# Patient Record
Sex: FEMALE | Race: WHITE | NOT HISPANIC OR LATINO | Employment: OTHER | URBAN - NONMETROPOLITAN AREA
[De-identification: names, ages, dates, MRNs, and addresses within clinical notes are randomized per-mention and may not be internally consistent; named-entity substitution may affect disease eponyms.]

---

## 2018-05-09 ENCOUNTER — OFFICE VISIT (OUTPATIENT)
Dept: SURGERY | Facility: CLINIC | Age: 58
End: 2018-05-09

## 2018-05-09 VITALS — HEIGHT: 63 IN | WEIGHT: 166 LBS | BODY MASS INDEX: 29.41 KG/M2

## 2018-05-09 DIAGNOSIS — Z95.828 PORTACATH IN PLACE: Primary | ICD-10-CM

## 2018-05-09 PROCEDURE — 36590 REMOVAL TUNNELED CV CATH: CPT | Performed by: SURGERY

## 2018-05-11 PROBLEM — Z95.828 PORTACATH IN PLACE: Status: ACTIVE | Noted: 2018-05-11

## 2018-05-11 NOTE — PROGRESS NOTES
Port removal    The patient left chest was prepped and draped in usual sterile fashion.  Timeout procedure was performed.  Local anesthetic was inflated overlying the port.  Through the previous placement incision and incision was made with a 15 blade scalpel.  Dissection was carried to the subcutaneous tissues tissues to the scar tissue surrounding the port.  This was opened sharply with a scalpel and the catheter tubing was removed from the left subclavian vein.  Direct pressure was held to hemostatic.  The subcutaneous port was then freed from surrounding scar tissue and removed.  The wound was hemostatic and closed in layers and dressed with sure close.  Patient tolerated the procedure well.    Estimated blood loss 5 mL    Specimens: Port    Complications none

## 2020-12-02 ENCOUNTER — HOSPITAL ENCOUNTER (EMERGENCY)
Facility: HOSPITAL | Age: 60
Discharge: HOME | End: 2020-12-03
Attending: FAMILY MEDICINE | Admitting: FAMILY MEDICINE

## 2020-12-02 DIAGNOSIS — N39.0 URINARY TRACT INFECTION WITHOUT HEMATURIA, SITE UNSPECIFIED: Primary | ICD-10-CM

## 2020-12-02 DIAGNOSIS — N28.89 RENAL MASS: ICD-10-CM

## 2020-12-02 PROCEDURE — 99283 EMERGENCY DEPT VISIT LOW MDM: CPT

## 2020-12-03 ENCOUNTER — APPOINTMENT (OUTPATIENT)
Dept: CT IMAGING | Facility: HOSPITAL | Age: 60
End: 2020-12-03

## 2020-12-03 VITALS
DIASTOLIC BLOOD PRESSURE: 95 MMHG | WEIGHT: 179 LBS | BODY MASS INDEX: 31.71 KG/M2 | SYSTOLIC BLOOD PRESSURE: 141 MMHG | TEMPERATURE: 98.7 F | RESPIRATION RATE: 20 BRPM | HEIGHT: 63 IN | HEART RATE: 87 BPM | OXYGEN SATURATION: 95 %

## 2020-12-03 LAB
ALBUMIN SERPL-MCNC: 3.51 G/DL (ref 3.5–5.2)
ALBUMIN/GLOB SERPL: 1.2 G/DL
ALP SERPL-CCNC: 98 U/L (ref 39–117)
ALT SERPL W P-5'-P-CCNC: 9 U/L (ref 1–33)
ANION GAP SERPL CALCULATED.3IONS-SCNC: 10.5 MMOL/L (ref 5–15)
AST SERPL-CCNC: 16 U/L (ref 1–32)
BACTERIA UR QL AUTO: ABNORMAL /HPF
BASOPHILS # BLD AUTO: 0.06 10*3/MM3 (ref 0–0.2)
BASOPHILS NFR BLD AUTO: 0.5 % (ref 0–1.5)
BILIRUB SERPL-MCNC: 0.3 MG/DL (ref 0–1.2)
BILIRUB UR QL STRIP: NEGATIVE
BUN SERPL-MCNC: 7 MG/DL (ref 8–23)
BUN/CREAT SERPL: 7.9 (ref 7–25)
CALCIUM SPEC-SCNC: 9 MG/DL (ref 8.6–10.5)
CHLORIDE SERPL-SCNC: 104 MMOL/L (ref 98–107)
CLARITY UR: ABNORMAL
CO2 SERPL-SCNC: 22.5 MMOL/L (ref 22–29)
COLOR UR: YELLOW
CREAT SERPL-MCNC: 0.89 MG/DL (ref 0.57–1)
CRP SERPL-MCNC: 0.57 MG/DL (ref 0–0.5)
D-LACTATE SERPL-SCNC: 1.3 MMOL/L (ref 0.5–2)
DEPRECATED RDW RBC AUTO: 46.8 FL (ref 37–54)
EOSINOPHIL # BLD AUTO: 0.14 10*3/MM3 (ref 0–0.4)
EOSINOPHIL NFR BLD AUTO: 1.1 % (ref 0.3–6.2)
ERYTHROCYTE [DISTWIDTH] IN BLOOD BY AUTOMATED COUNT: 14.4 % (ref 12.3–15.4)
GFR SERPL CREATININE-BSD FRML MDRD: 65 ML/MIN/1.73
GLOBULIN UR ELPH-MCNC: 2.9 GM/DL
GLUCOSE SERPL-MCNC: 124 MG/DL (ref 65–99)
GLUCOSE UR STRIP-MCNC: NEGATIVE MG/DL
HCT VFR BLD AUTO: 40.8 % (ref 34–46.6)
HGB BLD-MCNC: 13.2 G/DL (ref 12–15.9)
HGB UR QL STRIP.AUTO: ABNORMAL
HYALINE CASTS UR QL AUTO: ABNORMAL /LPF
IMM GRANULOCYTES # BLD AUTO: 0.06 10*3/MM3 (ref 0–0.05)
IMM GRANULOCYTES NFR BLD AUTO: 0.5 % (ref 0–0.5)
KETONES UR QL STRIP: NEGATIVE
LEUKOCYTE ESTERASE UR QL STRIP.AUTO: ABNORMAL
LYMPHOCYTES # BLD AUTO: 2.24 10*3/MM3 (ref 0.7–3.1)
LYMPHOCYTES NFR BLD AUTO: 17.3 % (ref 19.6–45.3)
MCH RBC QN AUTO: 29 PG (ref 26.6–33)
MCHC RBC AUTO-ENTMCNC: 32.4 G/DL (ref 31.5–35.7)
MCV RBC AUTO: 89.7 FL (ref 79–97)
MONOCYTES # BLD AUTO: 0.89 10*3/MM3 (ref 0.1–0.9)
MONOCYTES NFR BLD AUTO: 6.9 % (ref 5–12)
NEUTROPHILS NFR BLD AUTO: 73.7 % (ref 42.7–76)
NEUTROPHILS NFR BLD AUTO: 9.54 10*3/MM3 (ref 1.7–7)
NITRITE UR QL STRIP: NEGATIVE
NRBC BLD AUTO-RTO: 0 /100 WBC (ref 0–0.2)
PH UR STRIP.AUTO: 6.5 [PH] (ref 5–8)
PLATELET # BLD AUTO: 271 10*3/MM3 (ref 140–450)
PMV BLD AUTO: 8.9 FL (ref 6–12)
POTASSIUM SERPL-SCNC: 3.9 MMOL/L (ref 3.5–5.2)
PROT SERPL-MCNC: 6.4 G/DL (ref 6–8.5)
PROT UR QL STRIP: NEGATIVE
RBC # BLD AUTO: 4.55 10*6/MM3 (ref 3.77–5.28)
RBC # UR: ABNORMAL /HPF
REF LAB TEST METHOD: ABNORMAL
SODIUM SERPL-SCNC: 137 MMOL/L (ref 136–145)
SP GR UR STRIP: 1.01 (ref 1–1.03)
SQUAMOUS #/AREA URNS HPF: ABNORMAL /HPF
UROBILINOGEN UR QL STRIP: ABNORMAL
WBC # BLD AUTO: 12.93 10*3/MM3 (ref 3.4–10.8)
WBC UR QL AUTO: ABNORMAL /HPF

## 2020-12-03 PROCEDURE — 87040 BLOOD CULTURE FOR BACTERIA: CPT | Performed by: FAMILY MEDICINE

## 2020-12-03 PROCEDURE — 96375 TX/PRO/DX INJ NEW DRUG ADDON: CPT

## 2020-12-03 PROCEDURE — 86140 C-REACTIVE PROTEIN: CPT | Performed by: FAMILY MEDICINE

## 2020-12-03 PROCEDURE — 36415 COLL VENOUS BLD VENIPUNCTURE: CPT

## 2020-12-03 PROCEDURE — 87186 SC STD MICRODIL/AGAR DIL: CPT | Performed by: FAMILY MEDICINE

## 2020-12-03 PROCEDURE — 96374 THER/PROPH/DIAG INJ IV PUSH: CPT

## 2020-12-03 PROCEDURE — 87086 URINE CULTURE/COLONY COUNT: CPT | Performed by: FAMILY MEDICINE

## 2020-12-03 PROCEDURE — 80053 COMPREHEN METABOLIC PANEL: CPT | Performed by: FAMILY MEDICINE

## 2020-12-03 PROCEDURE — 87077 CULTURE AEROBIC IDENTIFY: CPT | Performed by: FAMILY MEDICINE

## 2020-12-03 PROCEDURE — 25010000002 IOPAMIDOL 61 % SOLUTION: Performed by: FAMILY MEDICINE

## 2020-12-03 PROCEDURE — 25010000002 KETOROLAC TROMETHAMINE PER 15 MG: Performed by: FAMILY MEDICINE

## 2020-12-03 PROCEDURE — 83605 ASSAY OF LACTIC ACID: CPT | Performed by: FAMILY MEDICINE

## 2020-12-03 PROCEDURE — 85025 COMPLETE CBC W/AUTO DIFF WBC: CPT | Performed by: FAMILY MEDICINE

## 2020-12-03 PROCEDURE — 74177 CT ABD & PELVIS W/CONTRAST: CPT

## 2020-12-03 PROCEDURE — 81001 URINALYSIS AUTO W/SCOPE: CPT | Performed by: FAMILY MEDICINE

## 2020-12-03 RX ORDER — HYDROCODONE BITARTRATE AND ACETAMINOPHEN 5; 325 MG/1; MG/1
1 TABLET ORAL EVERY 6 HOURS PRN
Status: DISCONTINUED | OUTPATIENT
Start: 2020-12-03 | End: 2020-12-03 | Stop reason: HOSPADM

## 2020-12-03 RX ORDER — KETOROLAC TROMETHAMINE 30 MG/ML
15 INJECTION, SOLUTION INTRAMUSCULAR; INTRAVENOUS ONCE
Status: COMPLETED | OUTPATIENT
Start: 2020-12-03 | End: 2020-12-03

## 2020-12-03 RX ORDER — ORPHENADRINE CITRATE 30 MG/ML
60 INJECTION INTRAMUSCULAR; INTRAVENOUS ONCE
Status: DISCONTINUED | OUTPATIENT
Start: 2020-12-03 | End: 2020-12-03

## 2020-12-03 RX ORDER — SODIUM CHLORIDE 0.9 % (FLUSH) 0.9 %
10 SYRINGE (ML) INJECTION AS NEEDED
Status: DISCONTINUED | OUTPATIENT
Start: 2020-12-03 | End: 2020-12-03 | Stop reason: HOSPADM

## 2020-12-03 RX ADMIN — LIDOCAINE HYDROCHLORIDE 125 MG: 10 INJECTION, SOLUTION INFILTRATION; PERINEURAL at 03:45

## 2020-12-03 RX ADMIN — SODIUM CHLORIDE 1000 ML: 9 INJECTION, SOLUTION INTRAVENOUS at 00:36

## 2020-12-03 RX ADMIN — IOPAMIDOL 100 ML: 612 INJECTION, SOLUTION INTRAVENOUS at 02:03

## 2020-12-03 RX ADMIN — HYDROCODONE BITARTRATE AND ACETAMINOPHEN 1 TABLET: 5; 325 TABLET ORAL at 04:14

## 2020-12-03 RX ADMIN — KETOROLAC TROMETHAMINE 15 MG: 30 INJECTION, SOLUTION INTRAMUSCULAR; INTRAVENOUS at 00:36

## 2020-12-03 NOTE — DISCHARGE INSTRUCTIONS
COntinue Cipro  Increase oral hydration  NOtify Dr Fernando at [facility] for follow up appointment

## 2020-12-03 NOTE — ED PROVIDER NOTES
Subjective   Patient reports that she has been treated for a UTI for the last 3 days she has been on Cipro and then today developed abdominal discomfort as well as left flank pain and so she is afraid that her UTI is worsening.      History provided by:  Patient  Flank Pain  Pain location:  L flank  Pain quality: aching    Pain radiates to:  Does not radiate  Pain severity:  Moderate  Timing:  Intermittent  Progression:  Worsening  Chronicity:  New  Context: recent illness    Relieved by:  Nothing  Worsened by:  Nothing  Ineffective treatments:  None tried  Associated symptoms: nausea    Associated symptoms: no chest pain, no dysuria and no fever    Risk factors: being elderly and obesity        Review of Systems   Constitutional: Negative.  Negative for fever.   HENT: Negative.    Respiratory: Negative.    Cardiovascular: Negative.  Negative for chest pain.   Gastrointestinal: Positive for nausea. Negative for abdominal pain.   Endocrine: Negative.    Genitourinary: Positive for flank pain. Negative for dysuria.   Skin: Negative.    Neurological: Negative.    Psychiatric/Behavioral: Negative.    All other systems reviewed and are negative.      Past Medical History:   Diagnosis Date   • Anxiety    • Arthritis    • Asthma    • Cancer (CMS/HCC)     esogeal   • COPD (chronic obstructive pulmonary disease) (CMS/HCC)    • Depression    • Fluid retention    • GERD (gastroesophageal reflux disease)    • Hyperlipidemia    • Mobitz (type) II atrioventricular block    • PTSD (post-traumatic stress disorder)        Allergies   Allergen Reactions   • Codeine GI Intolerance   • Penicillins Hives       Past Surgical History:   Procedure Laterality Date   • CATARACT EXTRACTION Bilateral    •  SECTION     • CHOLECYSTECTOMY     • ENDOSCOPY W/ PEG TUBE PLACEMENT N/A 2016    Procedure: ESOPHAGOGASTRODUODENOSCOPY REMOVE AND REPLACE PEG TUBE;  Surgeon: Leonardo Alonso MD;  Location: [facility];  Service:    •  ESOPHAGECTOMY     • PEG TUBE REPLACEMENT  06/06/2016   • SALPINGO OOPHORECTOMY Left 1991       Family History   Problem Relation Age of Onset   • Cancer Father    • Heart disease Maternal Aunt    • Cancer Maternal Aunt    • Cancer Maternal Grandmother    • Cancer Maternal Grandfather    • Cancer Paternal Grandfather        Social History     Socioeconomic History   • Marital status:      Spouse name: Not on file   • Number of children: Not on file   • Years of education: Not on file   • Highest education level: Not on file   Tobacco Use   • Smoking status: Former Smoker     Packs/day: 1.00     Years: 40.00     Pack years: 40.00   Substance and Sexual Activity   • Alcohol use: No   • Drug use: No   • Sexual activity: Never           Objective   Physical Exam  Vitals signs and nursing note reviewed.   Constitutional:       Appearance: Normal appearance.   HENT:      Head: Normocephalic and atraumatic.      Left Ear: Tympanic membrane normal.      Nose: Nose normal.      Mouth/Throat:      Mouth: Mucous membranes are dry.   Neck:      Musculoskeletal: Normal range of motion.   Cardiovascular:      Rate and Rhythm: Normal rate and regular rhythm.      Pulses: Normal pulses.   Pulmonary:      Effort: Pulmonary effort is normal.      Breath sounds: Normal breath sounds.   Abdominal:      General: Abdomen is flat.   Musculoskeletal: Normal range of motion.   Skin:     General: Skin is warm.      Capillary Refill: Capillary refill takes less than 2 seconds.   Neurological:      General: No focal deficit present.      Mental Status: She is alert and oriented to person, place, and time.         Procedures           ED Course  ED Course as of Dec 04 1345   Thu Dec 03, 2020   0250 No clearly acute findings in the abdomen or pelvis.  2. Enhancing mass in the central right kidney highly suspicious for renal cell carcinoma. No evidence of metastatic disease. Urologic follow-up recommended.  3. Nonobstructing bilateral  kidney stones. No ureteral stones. No hydronephrosis. There is no CT evidence of acute pyelonephritis.  4. Cholecystectomy with intra and extrahepatic biliary ductal dilatation, stable from prior.  5. Moderate stool burden in the colon suggesting constipation. There is colonic diverticulosis without diverticulitis.  6. Normal appendix. No small bowel obstruction.  7. Additional findings as above.       Findings with patient patient was already aware of the right sided renal mass.  She follows with Dr. Fernando at the [facility] at .  He is due for repeat scan in 2 weeks.    [MH]   0300 Patient is already on ciprofloxacin for UTI she has had 3 doses and advised her to continue that until the treatment is resolved.  Patient shows no bacteria seen on this UA however her culture has went down.    [MH]      ED Course User Index  [] Riddhi Yeh DO                                           MDM  Number of Diagnoses or Management Options  Renal mass: new and requires workup  Urinary tract infection without hematuria, site unspecified: new and requires workup     Amount and/or Complexity of Data Reviewed  Clinical lab tests: ordered and reviewed  Tests in the radiology section of CPT®: ordered and reviewed  Tests in the medicine section of CPT®: reviewed and ordered  Independent visualization of images, tracings, or specimens: yes    Risk of Complications, Morbidity, and/or Mortality  Presenting problems: high  Diagnostic procedures: high  Management options: moderate        Final diagnoses:   Urinary tract infection without hematuria, site unspecified   Renal mass            Riddhi Yeh DO  12/04/20 1540

## 2020-12-05 LAB — BACTERIA SPEC AEROBE CULT: ABNORMAL

## 2020-12-06 ENCOUNTER — TELEPHONE (OUTPATIENT)
Dept: EMERGENCY DEPT | Facility: HOSPITAL | Age: 60
End: 2020-12-06

## 2020-12-08 LAB
BACTERIA SPEC AEROBE CULT: NORMAL
BACTERIA SPEC AEROBE CULT: NORMAL

## 2021-02-25 DIAGNOSIS — Z23 IMMUNIZATION DUE: ICD-10-CM

## 2022-04-28 ENCOUNTER — HOSPITAL ENCOUNTER (OUTPATIENT)
Dept: GENERAL RADIOLOGY | Facility: HOSPITAL | Age: 62
Discharge: HOME | End: 2022-04-28

## 2022-04-28 ENCOUNTER — TRANSCRIBE ORDERS (OUTPATIENT)
Dept: OTHER | Facility: OTHER | Age: 62
End: 2022-04-28

## 2022-04-28 DIAGNOSIS — M54.12 BRACHIAL NEURITIS: Primary | ICD-10-CM

## 2022-04-28 DIAGNOSIS — M54.50 LOW BACK PAIN, UNSPECIFIED BACK PAIN LATERALITY, UNSPECIFIED CHRONICITY, UNSPECIFIED WHETHER SCIATICA PRESENT: ICD-10-CM

## 2022-04-28 DIAGNOSIS — M54.12 BRACHIAL NEURITIS: ICD-10-CM

## 2022-04-28 PROCEDURE — 72050 X-RAY EXAM NECK SPINE 4/5VWS: CPT

## 2022-04-28 PROCEDURE — 72050 X-RAY EXAM NECK SPINE 4/5VWS: CPT | Performed by: RADIOLOGY

## 2022-04-28 PROCEDURE — 72110 X-RAY EXAM L-2 SPINE 4/>VWS: CPT

## 2022-04-28 PROCEDURE — 72110 X-RAY EXAM L-2 SPINE 4/>VWS: CPT | Performed by: RADIOLOGY

## 2023-08-30 ENCOUNTER — OFFICE VISIT (OUTPATIENT)
Dept: CARDIOLOGY | Facility: CLINIC | Age: 63
End: 2023-08-30
Payer: MEDICARE

## 2023-08-30 VITALS
HEART RATE: 111 BPM | WEIGHT: 143.4 LBS | BODY MASS INDEX: 25.41 KG/M2 | DIASTOLIC BLOOD PRESSURE: 76 MMHG | SYSTOLIC BLOOD PRESSURE: 112 MMHG | HEIGHT: 63 IN | OXYGEN SATURATION: 99 %

## 2023-08-30 DIAGNOSIS — R06.02 SHORTNESS OF BREATH: Primary | ICD-10-CM

## 2023-08-30 PROCEDURE — 99204 OFFICE O/P NEW MOD 45 MIN: CPT | Performed by: INTERNAL MEDICINE

## 2023-08-30 PROCEDURE — 93000 ELECTROCARDIOGRAM COMPLETE: CPT | Performed by: INTERNAL MEDICINE

## 2023-08-30 RX ORDER — TRIAMCINOLONE ACETONIDE 1 MG/G
CREAM TOPICAL
COMMUNITY

## 2023-08-30 RX ORDER — ATOGEPANT 60 MG/1
TABLET ORAL
COMMUNITY
Start: 2023-08-03

## 2023-08-30 RX ORDER — VORTIOXETINE 10 MG/1
TABLET, FILM COATED ORAL
COMMUNITY
Start: 2023-08-04

## 2023-08-30 RX ORDER — ROSUVASTATIN CALCIUM 5 MG/1
5 TABLET, COATED ORAL DAILY
Qty: 30 TABLET | Refills: 11 | Status: SHIPPED | OUTPATIENT
Start: 2023-08-30 | End: 2023-08-30 | Stop reason: SDUPTHER

## 2023-08-30 RX ORDER — MONTELUKAST SODIUM 10 MG/1
TABLET ORAL
COMMUNITY
Start: 2023-08-07

## 2023-08-30 RX ORDER — GUAIFENESIN 600 MG/1
1200 TABLET, EXTENDED RELEASE ORAL
COMMUNITY

## 2023-08-30 RX ORDER — METOPROLOL SUCCINATE 25 MG/1
25 TABLET, EXTENDED RELEASE ORAL DAILY
Qty: 30 TABLET | Refills: 11 | Status: SHIPPED | OUTPATIENT
Start: 2023-08-30 | End: 2023-08-30 | Stop reason: SDUPTHER

## 2023-08-30 RX ORDER — METOPROLOL SUCCINATE 25 MG/1
25 TABLET, EXTENDED RELEASE ORAL DAILY
Qty: 30 TABLET | Refills: 11 | Status: SHIPPED | OUTPATIENT
Start: 2023-08-30

## 2023-08-30 RX ORDER — ROSUVASTATIN CALCIUM 5 MG/1
5 TABLET, COATED ORAL DAILY
Qty: 30 TABLET | Refills: 11 | Status: SHIPPED | OUTPATIENT
Start: 2023-08-30

## 2023-08-30 RX ORDER — LEVETIRACETAM 750 MG/1
750 TABLET ORAL 2 TIMES DAILY
COMMUNITY

## 2023-08-30 RX ORDER — DUPILUMAB 300 MG/2ML
INJECTION, SOLUTION SUBCUTANEOUS
COMMUNITY
Start: 2023-08-05

## 2023-08-30 RX ORDER — METHENAMINE HIPPURATE 1000 MG/1
1 TABLET ORAL
COMMUNITY
Start: 2023-08-08 | End: 2024-08-07

## 2023-08-30 NOTE — PROGRESS NOTES
[facility] CARDIOLOGY  [address] [place] [address]  Phone: [phone]  Fax: [phone]    2023    Chief Complaint   Patient presents with    Establish Care     Complains of some chest tightness,SOA,mild leg/ankle swelling, chronic fatigue, dizziness,lightheadedness,near syncopal episodes, right foot feels delayed.    Med Review     Tolerating all current medications well.        History:     Christine Pimentel is a 63 y.o. female presenting for  initial evaluation   Clinically stable from cardiac standpoint   Symptoms:  CP typical  SOB worsening    Orthopnea Yes  Lower extremity edema stable    Palpitations stable   Compliant with medications yes  Claudication yes      Past Medical History:   Diagnosis Date    Anxiety     Arthritis     Asthma     Cancer     esogeal    COPD (chronic obstructive pulmonary disease)     Depression     Fluid retention     GERD (gastroesophageal reflux disease)     Hyperlipidemia     Mobitz (type) II atrioventricular block     PTSD (post-traumatic stress disorder)        Past Surgical History:   Procedure Laterality Date    CATARACT EXTRACTION Bilateral      SECTION      CHOLECYSTECTOMY      ENDOSCOPY W/ PEG TUBE PLACEMENT N/A 2016    Procedure: ESOPHAGOGASTRODUODENOSCOPY REMOVE AND REPLACE PEG TUBE;  Surgeon: Leonardo Alonso MD;  Location: [facility];  Service:     ESOPHAGECTOMY      PEG TUBE REPLACEMENT  2016    SALPINGO OOPHORECTOMY Left         Past Social History:  Social History     Socioeconomic History    Marital status:    Tobacco Use    Smoking status: Every Day     Packs/day: 1.50     Years: 40.00     Pack years: 60.00     Types: Cigarettes   Substance and Sexual Activity    Alcohol use: No    Drug use: No    Sexual activity: Never       Past Family History:  Family History   Problem Relation Age of Onset    Cancer Father     Heart disease Maternal Aunt     Cancer Maternal Aunt     Cancer Maternal  Grandmother     Cancer Maternal Grandfather     Cancer Paternal Grandfather        Review of Systems:   ROS       Current Outpatient Medications   Medication Sig Dispense Refill    acetaminophen (TYLENOL) 325 MG tablet Take 2 tablets by mouth Every 8 (Eight) Hours.      albuterol sulfate  (90 Base) MCG/ACT inhaler ProAir  (90 Base) MCG/ACT Inhalation Aerosol Solution; Patient Sig: ProAir  (90 Base) MCG/ACT Inhalation Aerosol Solution ; 25; 0; 25-Nov-2014; Active      aspirin 81 MG chewable tablet Chew 1 tablet Daily.      Atogepant (QULIPTA PO) Take  by mouth.      Budeson-Glycopyrrol-Formoterol (BREZTRI AEROSPHERE IN) Inhale.      busPIRone (BUSPAR) 15 MG tablet Take 1 tablet by mouth 2 (Two) Times a Day.      desloratadine (CLARINEX) 5 MG tablet Take 1 tablet by mouth Daily. Patient states she no longer takes      DULoxetine (CYMBALTA) 30 MG capsule Take 1 capsule by mouth 3 (Three) Times a Day.      Dupixent 300 MG/2ML solution pen-injector       Ergocalciferol (VITAMIN D2 PO) Take  by mouth.      esomeprazole (NexIUM) 20 MG capsule Take 1 capsule by mouth Every Morning Before Breakfast.      flunisolide (NASALIDE) 25 MCG/ACT (0.025%) solution nasal spray Inhale 2 sprays Every 12 (Twelve) Hours.      guaiFENesin (MUCINEX) 600 MG 12 hr tablet Take 2 tablets by mouth.      HYDROcodone-acetaminophen (HYCET) 7.5-325 MG/15ML solution 10 mL by Per J Tube route Every 8 (Eight) Hours As Needed for moderate pain (4-6). 118 mL 0    HYDROcodone-acetaminophen (NORCO) 7.5-325 MG per tablet Take 1 tablet by mouth Every 6 (Six) Hours As Needed for moderate pain (4-6). 15 tablet 0    hydrOXYzine (VISTARIL) 25 MG capsule 2 capsules 4 (Four) Times a Day As Needed.  1    hydrOXYzine (VISTARIL) 50 MG capsule Take 1 capsule by mouth 4 (Four) Times a Day As Needed.      LATUDA 80 MG tablet TAKE 1 TABLET BY MOUTH EVERY DAY  2    levETIRAcetam (KEPPRA) 750 MG tablet Take 1 tablet by mouth 2 (Two) Times a Day.       methenamine (HIPREX) 1 g tablet Take 1 tablet by mouth.      metoprolol succinate XL (TOPROL-XL) 25 MG 24 hr tablet Take 1 tablet by mouth Daily. 30 tablet 11    montelukast (SINGULAIR) 10 MG tablet       ondansetron (ZOFRAN) 4 MG tablet Take 1 tablet by mouth As Needed for Nausea or Vomiting.      ondansetron ODT (ZOFRAN-ODT) 4 MG disintegrating tablet Take 1 tablet by mouth Every 6 (Six) Hours As Needed for nausea or vomiting. 12 tablet 0    prochlorperazine (COMPAZINE) 10 MG tablet Take 1 tablet by mouth Every 6 (Six) Hours As Needed for nausea or vomiting. 12 tablet 0    promethazine (PHENERGAN) 25 MG tablet Take 1 tablet by mouth Every 6 (Six) Hours As Needed.      Qulipta 60 MG tablet       rosuvastatin (CRESTOR) 5 MG tablet Take 1 tablet by mouth Daily. 30 tablet 11    triamcinolone (KENALOG) 0.1 % cream Apply  topically to the appropriate area as directed.      Trintellix 10 MG tablet tablet       baclofen (LIORESAL) 10 MG tablet Take 10 mg by mouth 3 (three) times a day. (Patient not taking: Reported on 8/30/2023)      butalbital-aspirin-caffeine (FIORINAL) -40 MG per tablet Take 1 tablet by mouth Every 8 (Eight) Hours. (Patient not taking: Reported on 8/30/2023)      carBAMazepine (TEGretol) 100 MG/5ML suspension Take 100 mg by mouth 4 (Four) Times a Day. (Patient not taking: Reported on 8/30/2023)      carBAMazepine (TEGretol) 200 MG tablet Take 200 mg by mouth 3 (three) times a day. (Patient not taking: Reported on 8/30/2023)      Cholecalciferol (VITAMIN D3) 5000 UNITS capsule capsule Take 1,000 Units by mouth Daily. (Patient not taking: Reported on 8/30/2023)      famotidine (PEPCID) 20 MG tablet 20 mg by Per J Tube route Daily. (Patient not taking: Reported on 8/30/2023)      furosemide (LASIX) 20 MG tablet Take 20 mg by mouth As Needed. (Patient not taking: Reported on 8/30/2023)      gabapentin (NEURONTIN) 400 MG capsule Take 400 mg by mouth 3 (three) times a day. (Patient not taking: Reported  "on 8/30/2023)      lamoTRIgine (LaMICtal) 100 MG tablet Take 100 mg by mouth 2 (two) times a day. (Patient not taking: Reported on 8/30/2023)      melatonin 5 MG tablet tablet Take 5 mg by mouth At Night As Needed. (Patient not taking: Reported on 8/30/2023)      PARoxetine (PAXIL) 30 MG tablet Take 30 mg by mouth Every Morning. (Patient not taking: Reported on 8/30/2023)      tiotropium (SPIRIVA) 18 MCG per inhalation capsule Place 1 capsule into inhaler and inhale daily. (Patient not taking: Reported on 8/30/2023)      topiramate (TOPAMAX) 25 MG tablet Take 25 mg by mouth every night. (Patient not taking: Reported on 8/30/2023)      traZODone (DESYREL) 100 MG tablet Take 50 mg by mouth At Night As Needed. (Patient not taking: Reported on 8/30/2023)       No current facility-administered medications for this visit.        Allergies   Allergen Reactions    Codeine GI Intolerance    Penicillins Hives       Objective     /76   Pulse 111   Ht 160 cm (63\")   Wt 65 kg (143 lb 6.4 oz)   SpO2 99%   BMI 25.40 kg/m²     Physical Exam       DATA:   Results for orders placed in visit on 08/30/23    Adult Transthoracic Echo Complete w/ Color, Spectral and Contrast if necessary per protocol    Interpretation Summary    Left ventricular systolic function is normal. Left ventricular ejection fraction appears to be 61 - 65%.    Left ventricular diastolic function is consistent with (grade I) impaired relaxation.    There is no evidence of pericardial effusion.         ECG 12 Lead    Date/Time: 8/30/2023 1:49 PM  Performed by: Rafi Bridges MD  Authorized by: Rafi Bridges MD          No results found for: CHOL, CHLPL  No results found for: TRIG  No results found for: HDL  No results found for: LDL, LDLDIRECT    No results found for: TSH            Invalid input(s): LABALBU, PROT        Assessment and Plan    Assessment and Plan    Problem List Items Addressed This Visit          Pulmonary " and Pneumonias    Shortness of breath - Primary    Relevant Orders    Adult Transthoracic Echo Complete w/ Color, Spectral and Contrast if necessary per protocol (Completed)    Stress Test With Myocardial Perfusion (1 Day) (Completed)           Recommended increase activity to 30 minutes of walking daily, most days of the week    Thank you for allowing me to participate in the care of Christine Pimentel. Feel free to contact me directly with any further questions or concerns.          Rafi Bridges MD, FACC  Interventional Cardiology

## 2023-09-05 ENCOUNTER — HOSPITAL ENCOUNTER (OUTPATIENT)
Dept: CARDIOLOGY | Facility: HOSPITAL | Age: 63
Discharge: HOME | End: 2023-09-05
Payer: MEDICARE

## 2023-09-05 ENCOUNTER — HOSPITAL ENCOUNTER (OUTPATIENT)
Dept: NUCLEAR MEDICINE | Facility: HOSPITAL | Age: 63
Discharge: HOME | End: 2023-09-05
Payer: MEDICARE

## 2023-09-05 DIAGNOSIS — R06.02 SHORTNESS OF BREATH: ICD-10-CM

## 2023-09-05 PROCEDURE — 25010000002 REGADENOSON 0.4 MG/5ML SOLUTION: Performed by: INTERNAL MEDICINE

## 2023-09-05 PROCEDURE — 93306 TTE W/DOPPLER COMPLETE: CPT

## 2023-09-05 PROCEDURE — 93017 CV STRESS TEST TRACING ONLY: CPT

## 2023-09-05 PROCEDURE — 78452 HT MUSCLE IMAGE SPECT MULT: CPT

## 2023-09-05 PROCEDURE — 0 TECHNETIUM SESTAMIBI: Performed by: INTERNAL MEDICINE

## 2023-09-05 PROCEDURE — A9500 TC99M SESTAMIBI: HCPCS | Performed by: INTERNAL MEDICINE

## 2023-09-05 RX ORDER — REGADENOSON 0.08 MG/ML
0.4 INJECTION, SOLUTION INTRAVENOUS
Status: COMPLETED | OUTPATIENT
Start: 2023-09-05 | End: 2023-09-05

## 2023-09-05 RX ADMIN — REGADENOSON 0.4 MG: 0.08 INJECTION, SOLUTION INTRAVENOUS at 12:48

## 2023-09-05 RX ADMIN — TECHNETIUM TC 99M SESTAMIBI 1 DOSE: 1 INJECTION INTRAVENOUS at 12:48

## 2023-09-05 RX ADMIN — TECHNETIUM TC 99M SESTAMIBI 1 DOSE: 1 INJECTION INTRAVENOUS at 11:25

## 2023-09-07 LAB
BH CV REST NUCLEAR ISOTOPE DOSE: 9.5 MCI
BH CV STRESS COMMENTS STAGE 1: NORMAL
BH CV STRESS DOSE REGADENOSON STAGE 1: 0.4
BH CV STRESS DURATION MIN STAGE 1: 0
BH CV STRESS DURATION SEC STAGE 1: 10
BH CV STRESS NUCLEAR ISOTOPE DOSE: 27.8 MCI
BH CV STRESS PROTOCOL 1: NORMAL
BH CV STRESS RECOVERY BP: NORMAL MMHG
BH CV STRESS RECOVERY HR: 90 BPM
BH CV STRESS STAGE 1: 1
LV EF NUC BP: 73 %
MAXIMAL PREDICTED HEART RATE: 157 BPM
PERCENT MAX PREDICTED HR: 56.69 %
STRESS BASELINE BP: NORMAL MMHG
STRESS BASELINE HR: 72 BPM
STRESS PERCENT HR: 67 %
STRESS POST PEAK BP: NORMAL MMHG
STRESS POST PEAK HR: 89 BPM
STRESS TARGET HR: 133 BPM

## 2023-09-08 ENCOUNTER — TELEPHONE (OUTPATIENT)
Dept: CARDIOLOGY | Facility: CLINIC | Age: 63
End: 2023-09-08
Payer: MEDICARE

## 2023-09-08 LAB
BH CV ECHO MEAS - ACS: 1.7 CM
BH CV ECHO MEAS - AO MAX PG: 8.1 MMHG
BH CV ECHO MEAS - AO MEAN PG: 5 MMHG
BH CV ECHO MEAS - AO ROOT DIAM: 2.7 CM
BH CV ECHO MEAS - AO V2 MAX: 142 CM/SEC
BH CV ECHO MEAS - AO V2 VTI: 27.5 CM
BH CV ECHO MEAS - EDV(CUBED): 27 ML
BH CV ECHO MEAS - EDV(MOD-SP4): 41.4 ML
BH CV ECHO MEAS - EF(MOD-SP4): 65 %
BH CV ECHO MEAS - ESV(CUBED): 6.9 ML
BH CV ECHO MEAS - ESV(MOD-SP4): 14.5 ML
BH CV ECHO MEAS - FS: 36.7 %
BH CV ECHO MEAS - IVS/LVPW: 0.92 CM
BH CV ECHO MEAS - IVSD: 1.1 CM
BH CV ECHO MEAS - LA DIMENSION: 3.1 CM
BH CV ECHO MEAS - LAT PEAK E' VEL: 6.1 CM/SEC
BH CV ECHO MEAS - LV DIASTOLIC VOL/BSA (35-75): 24.7 CM2
BH CV ECHO MEAS - LV MASS(C)D: 102 GRAMS
BH CV ECHO MEAS - LV SYSTOLIC VOL/BSA (12-30): 8.6 CM2
BH CV ECHO MEAS - LVIDD: 3 CM
BH CV ECHO MEAS - LVIDS: 1.9 CM
BH CV ECHO MEAS - LVOT AREA: 2.8 CM2
BH CV ECHO MEAS - LVOT DIAM: 1.9 CM
BH CV ECHO MEAS - LVPWD: 1.2 CM
BH CV ECHO MEAS - MED PEAK E' VEL: 6.2 CM/SEC
BH CV ECHO MEAS - MV A MAX VEL: 109 CM/SEC
BH CV ECHO MEAS - MV E MAX VEL: 90 CM/SEC
BH CV ECHO MEAS - MV E/A: 0.83
BH CV ECHO MEAS - PA ACC TIME: 0.07 SEC
BH CV ECHO MEAS - SI(MOD-SP4): 16 ML/M2
BH CV ECHO MEAS - SV(MOD-SP4): 26.9 ML
BH CV ECHO MEAS - TAPSE (>1.6): 1.73 CM
BH CV ECHO MEASUREMENTS AVERAGE E/E' RATIO: 14.63
LEFT ATRIUM VOLUME INDEX: 10.4 ML/M2

## 2023-09-08 NOTE — TELEPHONE ENCOUNTER
Let patient know that Dr. Velasco does not see signs of MI in her stress or ECHO  Electronically signed by MAGDY Grey, 09/08/23, 2:42 PM EDT.

## 2023-09-08 NOTE — TELEPHONE ENCOUNTER
Patient notified that stress and echo tests are essentially normal    Electronically signed by MAGDY Grey, 09/08/23, 9:33 AM EDT.

## 2023-09-09 PROBLEM — R06.02 SHORTNESS OF BREATH: Status: ACTIVE | Noted: 2023-09-09

## 2023-11-02 ENCOUNTER — TRANSCRIBE ORDERS (OUTPATIENT)
Dept: ADMINISTRATIVE | Facility: HOSPITAL | Age: 63
End: 2023-11-02
Payer: MEDICARE

## 2023-11-02 DIAGNOSIS — M21.379 FOOT-DROP, UNSPECIFIED LATERALITY: Primary | ICD-10-CM

## 2023-11-22 ENCOUNTER — HOSPITAL ENCOUNTER (OUTPATIENT)
Dept: MRI IMAGING | Facility: HOSPITAL | Age: 63
Discharge: HOME | End: 2023-11-22
Admitting: PSYCHIATRY & NEUROLOGY
Payer: MEDICARE

## 2023-11-22 DIAGNOSIS — M21.379 FOOT-DROP, UNSPECIFIED LATERALITY: ICD-10-CM

## 2023-11-22 PROCEDURE — 72146 MRI CHEST SPINE W/O DYE: CPT

## 2024-03-22 RX ORDER — METOPROLOL SUCCINATE 25 MG/1
25 TABLET, EXTENDED RELEASE ORAL DAILY
Qty: 30 TABLET | Refills: 11 | Status: SHIPPED | OUTPATIENT
Start: 2024-03-22 | End: 2024-03-22 | Stop reason: SDUPTHER

## 2024-03-22 RX ORDER — METOPROLOL SUCCINATE 25 MG/1
25 TABLET, EXTENDED RELEASE ORAL DAILY
Qty: 90 TABLET | Refills: 3 | Status: SHIPPED | OUTPATIENT
Start: 2024-03-22

## 2024-03-22 RX ORDER — ROSUVASTATIN CALCIUM 5 MG/1
5 TABLET, COATED ORAL DAILY
Qty: 30 TABLET | Refills: 11 | Status: SHIPPED | OUTPATIENT
Start: 2024-03-22 | End: 2024-03-22 | Stop reason: SDUPTHER

## 2024-03-22 RX ORDER — ROSUVASTATIN CALCIUM 5 MG/1
5 TABLET, COATED ORAL DAILY
Qty: 90 TABLET | Refills: 3 | Status: SHIPPED | OUTPATIENT
Start: 2024-03-22

## 2024-05-10 ENCOUNTER — OFFICE VISIT (OUTPATIENT)
Dept: CARDIOLOGY | Facility: CLINIC | Age: 64
End: 2024-05-10
Payer: MEDICARE

## 2024-05-10 VITALS
HEIGHT: 63 IN | DIASTOLIC BLOOD PRESSURE: 66 MMHG | HEART RATE: 71 BPM | SYSTOLIC BLOOD PRESSURE: 105 MMHG | WEIGHT: 133 LBS | BODY MASS INDEX: 23.57 KG/M2 | OXYGEN SATURATION: 94 %

## 2024-05-10 DIAGNOSIS — I10 ESSENTIAL HYPERTENSION: ICD-10-CM

## 2024-05-10 DIAGNOSIS — C15.9 MALIGNANT NEOPLASM OF ESOPHAGUS, UNSPECIFIED LOCATION: ICD-10-CM

## 2024-05-10 DIAGNOSIS — E78.5 DYSLIPIDEMIA: ICD-10-CM

## 2024-05-10 DIAGNOSIS — Z91.89 CHRONIC CHEST PAIN WITH HIGH RISK FOR CAD: Primary | ICD-10-CM

## 2024-05-10 DIAGNOSIS — R07.9 CHRONIC CHEST PAIN WITH HIGH RISK FOR CAD: Primary | ICD-10-CM

## 2024-05-10 DIAGNOSIS — I20.89 OTHER FORMS OF ANGINA PECTORIS: ICD-10-CM

## 2024-05-10 DIAGNOSIS — G89.29 CHRONIC CHEST PAIN WITH HIGH RISK FOR CAD: Primary | ICD-10-CM

## 2024-05-10 PROCEDURE — 99214 OFFICE O/P EST MOD 30 MIN: CPT | Performed by: INTERNAL MEDICINE

## 2024-05-10 PROCEDURE — 3074F SYST BP LT 130 MM HG: CPT | Performed by: INTERNAL MEDICINE

## 2024-05-10 PROCEDURE — 3078F DIAST BP <80 MM HG: CPT | Performed by: INTERNAL MEDICINE

## 2024-05-10 PROCEDURE — 1160F RVW MEDS BY RX/DR IN RCRD: CPT | Performed by: INTERNAL MEDICINE

## 2024-05-10 PROCEDURE — 1159F MED LIST DOCD IN RCRD: CPT | Performed by: INTERNAL MEDICINE

## 2024-05-10 PROCEDURE — 93000 ELECTROCARDIOGRAM COMPLETE: CPT | Performed by: INTERNAL MEDICINE

## 2024-05-10 RX ORDER — DIPHENHYDRAMINE HCL 50 MG
50 CAPSULE ORAL EVERY 6 HOURS PRN
COMMUNITY

## 2024-05-10 RX ORDER — RANOLAZINE 500 MG/1
500 TABLET, EXTENDED RELEASE ORAL 2 TIMES DAILY
Qty: 60 TABLET | Refills: 12 | Status: SHIPPED | OUTPATIENT
Start: 2024-05-10

## 2024-05-10 RX ORDER — BUDESONIDE AND FORMOTEROL FUMARATE DIHYDRATE 160; 4.5 UG/1; UG/1
2 AEROSOL RESPIRATORY (INHALATION)
COMMUNITY

## 2024-05-10 RX ORDER — NICOTINE 21 MG/24HR
1 PATCH, TRANSDERMAL 24 HOURS TRANSDERMAL EVERY 24 HOURS
Qty: 30 PATCH | Refills: 6 | Status: SHIPPED | OUTPATIENT
Start: 2024-05-10

## 2024-05-10 RX ORDER — METOPROLOL SUCCINATE 25 MG/1
12.5 TABLET, EXTENDED RELEASE ORAL DAILY
Qty: 90 TABLET | Refills: 4 | Status: SHIPPED | OUTPATIENT
Start: 2024-05-10

## 2024-06-07 ENCOUNTER — TELEPHONE (OUTPATIENT)
Dept: CARDIOLOGY | Facility: CLINIC | Age: 64
End: 2024-06-07

## 2024-06-07 NOTE — TELEPHONE ENCOUNTER
Pt was complaining of weakness and syncopal episodes, and was concerned it was the ranolazine, ask Dr. Zaragoza and he has instructed the patient to stop the (Ranexa). Called and informed the patient, she is currently in the Hospital [facility], claims she has pneumonia and sepsis.

## 2024-08-13 ENCOUNTER — OFFICE VISIT (OUTPATIENT)
Dept: CARDIOLOGY | Facility: CLINIC | Age: 64
End: 2024-08-13
Payer: MEDICARE

## 2024-08-13 VITALS
WEIGHT: 142.4 LBS | SYSTOLIC BLOOD PRESSURE: 141 MMHG | DIASTOLIC BLOOD PRESSURE: 85 MMHG | HEIGHT: 63 IN | BODY MASS INDEX: 25.23 KG/M2 | HEART RATE: 65 BPM

## 2024-08-13 DIAGNOSIS — I10 ESSENTIAL HYPERTENSION: Primary | ICD-10-CM

## 2024-08-13 DIAGNOSIS — E78.5 DYSLIPIDEMIA: ICD-10-CM

## 2024-08-13 PROCEDURE — 3079F DIAST BP 80-89 MM HG: CPT | Performed by: INTERNAL MEDICINE

## 2024-08-13 PROCEDURE — 1159F MED LIST DOCD IN RCRD: CPT | Performed by: INTERNAL MEDICINE

## 2024-08-13 PROCEDURE — 99214 OFFICE O/P EST MOD 30 MIN: CPT | Performed by: INTERNAL MEDICINE

## 2024-08-13 PROCEDURE — 3077F SYST BP >= 140 MM HG: CPT | Performed by: INTERNAL MEDICINE

## 2024-08-13 PROCEDURE — 1160F RVW MEDS BY RX/DR IN RCRD: CPT | Performed by: INTERNAL MEDICINE

## 2024-08-13 RX ORDER — METHENAMINE HIPPURATE 1000 MG/1
1 TABLET ORAL
COMMUNITY
Start: 2024-01-23 | End: 2025-01-22

## 2024-08-13 NOTE — PROGRESS NOTES
Office Note    Subjective     Christine Pimentel is a 63 y.o. female who presents to day for follow up      Active Problems:  Problem List Items Addressed This Visit          Cardiac and Vasculature    Essential hypertension - Primary    Dyslipidemia       HPI  Patient is a pleasant 63-year-old female past medical history significant for kidney cancer, esophageal cancer also history of hypertension and cigarette smoking.  Was last seen on May 10, 2024 she was having some shortness of breath and chest pains.  Patient was found to have pneumonia and she was admitted to the hospital since then.  Patient is feeling better.  Denies any chest pains.  No breathing difficulties.  Shortness of breath at times.    Patient had part of her esophagus removed in the past but is able to eat by mouth. Previously had PEG tube also.     PRIOR MEDS  Current Outpatient Medications on File Prior to Visit   Medication Sig Dispense Refill    acetaminophen (TYLENOL) 325 MG tablet Take 2 tablets by mouth Every 8 (Eight) Hours.      aspirin 81 MG chewable tablet Chew 1 tablet Daily.      budesonide-formoterol (SYMBICORT) 160-4.5 MCG/ACT inhaler Inhale 2 puffs 2 (Two) Times a Day.      DESLORATADINE PO Take 10 mg by mouth 2 (Two) Times a Day.      diphenhydrAMINE (BENADRYL) 50 MG capsule Take 1 capsule by mouth Every 6 (Six) Hours As Needed for Itching.      Dupixent 300 MG/2ML solution pen-injector       Ergocalciferol (VITAMIN D2 PO) Take  by mouth.      esomeprazole (NexIUM) 20 MG capsule Take 1 capsule by mouth Every Morning Before Breakfast.      flunisolide (NASALIDE) 25 MCG/ACT (0.025%) solution nasal spray Inhale 2 sprays Every 12 (Twelve) Hours.      guaiFENesin (MUCINEX) 600 MG 12 hr tablet Take 2 tablets by mouth.      HYDROcodone-acetaminophen (HYCET) 7.5-325 MG/15ML solution 10 mL by Per J Tube route Every 8 (Eight) Hours As Needed for moderate pain (4-6). 118 mL 0    HYDROcodone-acetaminophen (NORCO) 7.5-325 MG per tablet Take 1  tablet by mouth Every 6 (Six) Hours As Needed for moderate pain (4-6). 15 tablet 0    hydrOXYzine pamoate (VISTARIL) 50 MG capsule 1 capsule 4 (Four) Times a Day As Needed.  1    LATUDA 80 MG tablet TAKE 1 TABLET BY MOUTH EVERY DAY  2    methenamine (HIPREX) 1 g tablet Take 1 tablet by mouth.      metoprolol succinate XL (TOPROL-XL) 25 MG 24 hr tablet Take 0.5 tablets by mouth Daily. 90 tablet 4    montelukast (SINGULAIR) 10 MG tablet       ondansetron (ZOFRAN) 4 MG tablet Take 1 tablet by mouth As Needed for Nausea or Vomiting.      ondansetron ODT (ZOFRAN-ODT) 4 MG disintegrating tablet Take 1 tablet by mouth Every 6 (Six) Hours As Needed for nausea or vomiting. 12 tablet 0    rosuvastatin (CRESTOR) 5 MG tablet Take 1 tablet by mouth Daily. 90 tablet 3    Trintellix 10 MG tablet tablet       [DISCONTINUED] albuterol sulfate  (90 Base) MCG/ACT inhaler ProAir  (90 Base) MCG/ACT Inhalation Aerosol Solution; Patient Sig: ProAir  (90 Base) MCG/ACT Inhalation Aerosol Solution ; 25; 0; 25-Nov-2014; Active      [DISCONTINUED] DULoxetine (CYMBALTA) 30 MG capsule Take 1 capsule by mouth 3 (Three) Times a Day.      nicotine (Nicoderm CQ) 14 MG/24HR patch Place 1 patch on the skin as directed by provider Daily. (Patient not taking: Reported on 8/13/2024) 30 patch 6    promethazine (PHENERGAN) 25 MG tablet Take 1 tablet by mouth Every 6 (Six) Hours As Needed. (Patient not taking: Reported on 8/13/2024)      tiotropium (SPIRIVA) 18 MCG per inhalation capsule Place 1 capsule into inhaler and inhale Daily. (Patient not taking: Reported on 8/13/2024)      triamcinolone (KENALOG) 0.1 % cream Apply  topically to the appropriate area as directed. (Patient not taking: Reported on 8/13/2024)      [DISCONTINUED] busPIRone (BUSPAR) 30 MG tablet Take 1 tablet by mouth 2 (Two) Times a Day. (Patient not taking: Reported on 8/13/2024)      [DISCONTINUED] Cholecalciferol (VITAMIN D3) 5000 UNITS capsule capsule Take 1,000  "Units by mouth Daily. (Patient not taking: Reported on 8/13/2024)      [DISCONTINUED] Qulipta 60 MG tablet  (Patient not taking: Reported on 8/13/2024)      [DISCONTINUED] ranolazine (Ranexa) 500 MG 12 hr tablet Take 1 tablet by mouth 2 (Two) Times a Day. (Patient not taking: Reported on 8/13/2024) 60 tablet 12     No current facility-administered medications on file prior to visit.       ALLERGIES  Codeine and Penicillins    HISTORY  Past Medical History:   Diagnosis Date    Anxiety     Arthritis     Asthma     Cancer     esogeal    COPD (chronic obstructive pulmonary disease)     Depression     Essential hypertension 5/10/2024    Fluid retention     GERD (gastroesophageal reflux disease)     Hyperlipidemia     Mobitz (type) II atrioventricular block     PTSD (post-traumatic stress disorder)        Social History     Socioeconomic History    Marital status:    Tobacco Use    Smoking status: Every Day     Current packs/day: 1.50     Average packs/day: 1.5 packs/day for 40.0 years (60.0 ttl pk-yrs)     Types: Cigarettes    Smokeless tobacco: Never   Vaping Use    Vaping status: Never Used   Substance and Sexual Activity    Alcohol use: No    Drug use: No    Sexual activity: Defer       Family History   Problem Relation Age of Onset    Cancer Father     Heart disease Maternal Aunt     Cancer Maternal Aunt     Cancer Maternal Grandmother     Cancer Maternal Grandfather     Cancer Paternal Grandfather        Review of Systems   Respiratory:  Positive for shortness of breath. Negative for apnea, cough, choking, chest tightness and wheezing.    Cardiovascular:  Negative for chest pain, palpitations and leg swelling.   Neurological:  Negative for dizziness, seizures, syncope, light-headedness and headaches.       Objective     VITALS: /85 (BP Location: Right arm, Patient Position: Sitting, Cuff Size: Adult)   Pulse 65   Ht 160 cm (63\")   Wt 64.6 kg (142 lb 6.4 oz)   BMI 25.23 kg/m²     LABS:   No visits " with results within 3 Month(s) from this visit.   Latest known visit with results is:   Hospital Outpatient Visit on 09/05/2023   Component Date Value Ref Range Status    BH CV REST NUCLEAR ISOTOPE DOSE 09/05/2023 9.5  mCi Final    BH CV STRESS NUCLEAR ISOTOPE DOSE 09/05/2023 27.8  mCi Final    BH CV STRESS PROTOCOL 1 09/05/2023 Pharmacologic   Final    Stage 1 09/05/2023 1.0   Final    Duration Min Stage 1 09/05/2023 0   Final    Duration Sec Stage 1 09/05/2023 10   Final    Stress Dose Regadenoson Stage 1 09/05/2023 0.40   Final    Stress Comments Stage 1 09/05/2023 10 sec bolus injection   Final    Baseline HR 09/05/2023 72  bpm Final    Baseline BP 09/05/2023 127/85  mmHg Final    Peak HR 09/05/2023 89  bpm Final    Peak BP 09/05/2023 138/72  mmHg Final    Recovery HR 09/05/2023 90  bpm Final    Recovery BP 09/05/2023 131/71  mmHg Final    Target HR (85%) 09/05/2023 133  bpm Final    Max. Pred. HR (100%) 09/05/2023 157  bpm Final    Percent Max Pred HR 09/05/2023 56.69  % Final    Percent Target HR 09/05/2023 67  % Final    Nuc Stress EF 09/05/2023 73  % Final         IMAGING:   No Images in the past 120 days found..  Results for orders placed during the hospital encounter of 09/05/23    Stress Test With Myocardial Perfusion (1 Day)    Interpretation Summary    Myocardial perfusion imaging indicates a normal myocardial perfusion study with no evidence of ischemia. Small apical defect likely secondary to breast attenuation artifact    Left ventricular ejection fraction is hyperdynamic (Calculated EF > 70%).    Impressions are consistent with a low risk study.    Breast attenuation artifact is present.    Findings consistent with a normal ECG stress test.     No results found for this or any previous visit.          EXAM:  Constitutional:       General: Awake.      Appearance: Healthy appearance. Not in distress.   Eyes:      Conjunctiva/sclera: Conjunctivae normal.   HENT:      Head: Normocephalic and atraumatic.       Nose: Nose normal.    Mouth/Throat:      Pharynx: Oropharynx is clear.   Neck:      Thyroid: Thyroid normal.      Vascular: No carotid bruit or JVD.   Pulmonary:      Effort: Pulmonary effort is normal.      Breath sounds: Normal breath sounds.   Chest:      Chest wall: Not tender to palpatation.   Cardiovascular:      PMI at left midclavicular line. Normal rate. Regular rhythm. Normal S1 with normal intensity. Normal S2 with normal intensity.       Murmurs: There is no murmur.      No gallop.  No rub.   Pulses:     Intact distal pulses.   Edema:     Peripheral edema absent.   Abdominal:      General: Bowel sounds are normal. There is no distension.      Palpations: Abdomen is soft. There is no hepatomegaly.      Tenderness: There is no abdominal tenderness.   Musculoskeletal: Normal range of motion.      Cervical back: Normal range of motion. Skin:     General: Skin is warm and dry. There is no cyanosis.      Coloration: Skin is not jaundiced.   Neurological:      Mental Status: Alert and oriented to person, place and time.      Motor: Motor function is intact.      Gait: Gait is intact.   Psychiatric:         Attention and Perception: Attention and perception normal.         Speech: Speech normal.         Behavior: Behavior is cooperative.         Cognition and Memory: Cognition and memory normal.         Judgment: Judgment normal.          Procedure   Procedures       Assessment & Plan     Diagnoses and all orders for this visit:    1. Essential hypertension (Primary)    2. Dyslipidemia          PLAN  1 cardiac.  Patient is here for follow-up.  Patient on last visit was having some chest pains and breathing difficulties and was diagnosed with pneumonia.  She is feeling better no angina symptoms.  Will continue to follow closely and clinically.  Patient did not get the CTA coronaries done.  Can offer her same in future if she has angina symptoms.  Patient had a negative stress test in September 2023 with  normal EF.  Her echocardiogram from September 5, 2023 was normal also.  2 hypertension.  Continue current medication.  Systolic less than 139, diastolic less than 89 will be advised  #3 hyperlipidemia would continue lipid-lowering medications           MEDS ORDERED DURING VISIT:    Medications Discontinued During This Encounter   Medication Reason    busPIRone (BUSPAR) 30 MG tablet *Therapy completed    Cholecalciferol (VITAMIN D3) 5000 UNITS capsule capsule *Therapy completed    albuterol sulfate  (90 Base) MCG/ACT inhaler *Therapy completed    DULoxetine (CYMBALTA) 30 MG capsule *Therapy completed    ranolazine (Ranexa) 500 MG 12 hr tablet *Therapy completed    Qulipta 60 MG tablet *Therapy completed        No orders of the defined types were placed in this encounter.        Follow Up:   Return in about 6 months (around 2/13/2025) for Recheck.    Patient was given instructions and counseling regarding her condition or for health maintenance advice. Please see specific information pulled into the AVS if appropriate.   As always, Dania Marino MD  I appreciate very much the opportunity to participate in the cardiovascular care of your patients. Please do not hesitate to call me with any questions with regards to Christine Pimentel evaluation and management.         This document has been electronically signed by Juana Zaragoza MD [facility], Interventional Cardiology  August 13, 2024 16:12 EDT    Dictated Utilizing Dragon Dictation: Part of this note may be an electronic transcription/translation of spoken language to printed text using the Dragon Dictation System.

## 2025-02-21 NOTE — TELEPHONE ENCOUNTER
Jennifer, this patient has not been seen in a while. She does have appointment scheduled 02/26/25 with you.

## 2025-02-24 DIAGNOSIS — E78.5 DYSLIPIDEMIA: Primary | ICD-10-CM

## 2025-02-24 RX ORDER — ROSUVASTATIN CALCIUM 5 MG/1
5 TABLET, COATED ORAL DAILY
Qty: 90 TABLET | Refills: 3 | OUTPATIENT
Start: 2025-02-24

## 2025-02-24 NOTE — TELEPHONE ENCOUNTER
Jennifer, this patient has not had any labs done at [facility] in a long time. Patient states she will come in early on the 26th and have her labs done here. I will put the orders in.

## 2025-02-26 PROBLEM — E78.5 DYSLIPIDEMIA: Chronic | Status: ACTIVE | Noted: 2024-05-10

## 2025-02-26 PROBLEM — I10 ESSENTIAL HYPERTENSION: Chronic | Status: ACTIVE | Noted: 2024-05-10

## 2025-03-10 RX ORDER — ROSUVASTATIN CALCIUM 5 MG/1
5 TABLET, COATED ORAL DAILY
Qty: 90 TABLET | Refills: 3 | OUTPATIENT
Start: 2025-03-10

## 2025-03-13 NOTE — TELEPHONE ENCOUNTER
Jennifer, I spoke with this patient and she states she has decided to see someone in [place].

## 2025-04-08 RX ORDER — ROSUVASTATIN CALCIUM 5 MG/1
5 TABLET, COATED ORAL DAILY
Qty: 30 TABLET | Refills: 0 | Status: SHIPPED | OUTPATIENT
Start: 2025-04-08

## 2025-04-08 NOTE — TELEPHONE ENCOUNTER
Ashley, I have scheduled this patient with you for April 30. She was requesting refills of medications. She has not kept last couple of her appointments.

## 2025-04-23 RX ORDER — ROSUVASTATIN CALCIUM 5 MG/1
5 TABLET, COATED ORAL DAILY
Qty: 30 TABLET | Refills: 0 | Status: SHIPPED | OUTPATIENT
Start: 2025-04-23

## 2025-05-20 RX ORDER — ROSUVASTATIN CALCIUM 5 MG/1
5 TABLET, COATED ORAL DAILY
Qty: 30 TABLET | Refills: 11 | Status: SHIPPED | OUTPATIENT
Start: 2025-05-20